# Patient Record
(demographics unavailable — no encounter records)

---

## 2024-11-08 NOTE — HISTORY OF PRESENT ILLNESS
[FreeTextEntry1] : 81 y/o male with BPH and LUTS here today for prostate volume measurement after 6 months. Nocturia x1-2, weak stream currently on Flomax Denies eating spicy, citrus, chocolate. Adequately hydrating  FHx of PCA: denies  Tobacco use: Former smoker  Last PSA: 3.16 ng/mL (03/13/2023) Last Creatinine: 1.16 mg/dL (08/13/2024)  Last UCx: negative (12/15/2023) Uro meds: Tamsulosin 0.4 mg, Dutasteride  11/07/2023 - ARIANNA: There is a 4.5 mm echogenic focus in the lower pole of the right kidney. A 0.8 x 0.8 cm cyst with a thin septation in the mid pole as well as a 1.2 x 0.7 cm simple cyst in the upper pole of the right kidney. There is a 3.2 x 3.6 cm simple cyst in the lower pole of the left kidney without flow. Both kidneys are normal in size and echogenicity without hydronephrosis or solid masses visualized  11/07/23 TRUS: Prostate Volume: 68 cc Length : 67 mm Width : 51 mm Height : 39 mm Impression: enlarged prostate  05/07/24 TRUS: Prostate Volume: 45.5 cc Length : 58 mm Width : 45.5 mm Height : 33.0 mm Impression: decreased in size from prior  Prostate decreasing in volume with med treatment. The patient will continue with his treatment for other 6 months and will come back for visit + TRUS.

## 2024-11-20 NOTE — ASSESSMENT
[FreeTextEntry1] : 1.  Essential hypertension blood pressure  presently i controlled on present medication metoprolol 50 Norvasc 5 and losartan 100.  Blood pressure continues to be well-maintained 2.  History of chronic ischemic heart disease status post PTCI no active angina reasonable exercise tolerance.  No angina  3.  Memory loss stable but a significant problem and ways on the patient's wife  4.  Abnormal echocardiogram with thickened pericardium the pericardial effusion placed on colchicine.  Echo tast visit  again shows thickening of the pericardium with a somewhat smaller pericardial effusion with normal left ventricular function.  The pericardium is thickened with a small effusion on echo in 2022  5.  Abnormal CT of the abdomen suggestive of pancreatic mass.  Patient has not undergone the ERCP.  He does not exercise that much but with walking he has no difficulty.  This has not been followed up on and the patient remains asymptomatic  6.  Status post  possible TIA    7.  Atrial fibrillation with controlled ventricular response.  Patient is asymptomatic.  He remains on Eliquis  8.  Lipid panel ideal  Patient is clinically stable with no active angina and stable blood pressure.  Patient is clinically stable with chronic atrial fibrillation with a controlled ventricular response well-controlled blood pressure.  There are no active cardiac issues

## 2024-11-20 NOTE — HISTORY OF PRESENT ILLNESS
[FreeTextEntry1] : 80 y/o male with BPH and LUTS here today for prostate volume measurement after 6 months. Nocturia x1-2, weak stream currently on Flomax Denies eating spicy, citrus, chocolate. Adequately hydrating  FHx of PCA: denies  Tobacco use: Former smoker  Last PSA: 3.16 ng/mL (03/13/2023) Last Creatinine: 1.16 mg/dL (08/13/2024)  Last UCx: negative (12/15/2023) Uro meds: Tamsulosin 0.4 mg, Dutasteride  11/07/2023 - ARIANNA: There is a 4.5 mm echogenic focus in the lower pole of the right kidney. A 0.8 x 0.8 cm cyst with a thin septation in the mid pole as well as a 1.2 x 0.7 cm simple cyst in the upper pole of the right kidney. There is a 3.2 x 3.6 cm simple cyst in the lower pole of the left kidney without flow. Both kidneys are normal in size and echogenicity without hydronephrosis or solid masses visualized  11/07/23 TRUS: Prostate Volume: 68 cc Length : 67 mm Width : 51 mm Height : 39 mm Impression: enlarged prostate  05/07/24 TRUS: Prostate Volume: 45.5 cc Length : 58 mm Width : 45.5 mm Height : 33.0 mm Impression: decreased in size from prior  05/07/2024 - Prostate decreasing in volume with med treatment. The patient will continue with his treatment for other 6 months and will come back for visit + TRUS.

## 2024-11-20 NOTE — DISCUSSION/SUMMARY
[FreeTextEntry1] : Patient should continue on his present regimen of medication.  There are no active cardiac issues.  His main issue presently revolves around his dementia has not changed significantly.  Routine follow-up again in 6 months

## 2024-11-20 NOTE — HISTORY OF PRESENT ILLNESS
[FreeTextEntry1] : To review, Syed is 80 years old with a history of hypertension hyperlipidemia and a history of coronary artery disease status post remote stent.  He has not had any active cardiac issue for some time.  He does have significant memory loss which has been evaluated.  He has an early dementia with significant short-term memory loss  He denies chest pain chest pressure shortness of breath.  He says he can walk for long distances without difficulty but in general he is fairly sedentary.  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 There were no orthostatic changes at that point  We had added a diuretic to his losartan.  He still not 100% careful with the salt in his diet  As a result of the blood work his Lipitor was increased to 40 a day.  Subsequent to this visit, the patient complained of dizziness and apparently had a fall with fracture of his ribs.  He was hospitalized at [facility] where he stayed for over a week and a half.  He was found to be significantly orthostatic and his hypertensive medications was cut back.  Losartan was stopped Norvasc was stopped and patient does remain on Toprol-XL 50  In addition echocardiography suggested a thickened pericardium with some degree of pericardial effusion but normal LV function.  EKG was unremarkable sed rate was normal  CT of the abdomen found an incidental pancreatic mass and he was seen by gastroenterology for outpatient work-up  He had significant confusion in the hospital with periods of disorientation.  Patient was preop for ERCP to evaluate the pancreatic mass.  He had been on colchicine which is now been discontinued for the thickened pericardium and pericardial effusion though his sed rate was normal.  He was orthostatic on last visit though not as significant as in the hospital  Repeat echocardiogram November 2021 some thickening of the pericardium no significant fluid normal left ventricular function no segmental wall motion abnormality and no significant valvular disease  Overall since last visit he has been feeling well without chest pain chest pressure palpitations.  He has no dizziness he has not fallen he is steady on his feet  Good deal of his medications were discontinued and today the wife does not know exactly what medications he is on.  Apparently it appears that he is on Norvasc 5 losartan 100 Toprol-XL 50  He remains asymptomatic without chest pain shortness of breath dizziness or syncope.  He is in good spirits  He was to undergo ERCP because of hypertension he saw me recently but was stable.  He has never had the ERCP  His memory loss has progressed significantly.  He recently had an episode described by his wife of some numbness in his hands and then some mumbling speech was brought to the emergency room at [place].  He was diagnosed as having a TIA.  Apparently imaging was unremarkable and he was discharged home on Plavix.  His blood pressure in the hospital was quite elevated  Patient has no recollection of this event.  He denies any symptoms of dizziness chest pain numbness or difficulty speaking   visit May 2022 he returns feeling well in good spirits without chest pain chest pressure shortness of breath headache dizziness or syncope.  EKG May 6, 2022 was initially read as normal sinus rhythm however and carefully reviewing the echo it appears that at that point he was in atrial fibrillation with a controlled ventricular response   patient has been feeling well and offers no complaints   visit November 1, 2022:  Patient had a routine visit with Dr. Rodríguez.  He was asymptomatic.  He denies palpitations dizziness or syncope.  His EKG revealed atrial fibrillation with a controlled ventricular response.    It appears that he has had intermittent atrial fibrillation in the past but most time is in sinus rhythm though the EKG in May did show atrial fibrillation in retrospect.    Visit January 17, 2023: Patient feels well denies palpitations shortness of breath chest pain.  According to his wife his memory difficulties have worsened.   present medications include allopurinol, amlodipine 5 mg aspirin 81 atorvastatin 40 donezepil 10 Eliquis 5 mg twice a day potassium citrate Flomax   EKG January 17, 2023 again reveals atrial fibrillation with a ventricular rate of 100 otherwise unremarkable    Visit May 31, 2023:  Blood work May 2023  WBC 8.43 hemoglobin 12.6 hematocrit 40.9  lipid panel triglycerides 109 cholesterol 109 HDL 45 LDL 42 non-HDL 64  sodium 145 potassium 4.0 CO2 22 glucose 155 BUN 19 creatinine 1.10  normal liver function tests except for alkaline phosphatase 122 GFR 68  Patient feels quite well without chest pain chest pressure shortness of breath.  He offers no complaints.  He has good exercise tolerance.  He does have a pleasant dementia with forgetfulness decreased memory but this has been stable   EKG May 31, 2023 A-fib ventricular rate controlled otherwise within normal limits  Visit February 9, 2024: EKG unchanged A-fib ventricular response controlled anteroseptal wall myocardial infarction age-indeterminate Patient overall feels well.  He has a mild dementia.  He is followed by urology and is on a new medication for an enlarged prostate which he did not know the name.  He denies chest pain chest pressure shortness of breath.  He has had no dizziness or syncope  Visit June 21, 2024 EKG is unchanged with decreased R wave progression No acute changes He offers no complaints of chest pain chest pressure shortness of breath lightheadedness or dizziness.  He does not exercise that much but when he walks he has no difficulty he does have a dementia which is stable  Dutasteride has been added to his regimen for benign prostatic hypertrophy  Visit November 20, 2024 Patient offers no complaints and feels well.  He has a pleasant dementia which has not changed.  He denies chest pain chest pressure shortness of breath.  He can walk distances without any difficulty though he is fairly sedentary.  His medications have not changed.

## 2024-11-20 NOTE — ASSESSMENT
[FreeTextEntry1] : 1.  Essential hypertension blood pressure  presently i controlled on present medication metoprolol 50 Norvasc 5 and losartan 100.  Blood pressure continues to be well-maintained 2.  History of chronic ischemic heart disease status post PTCI no active angina reasonable exercise tolerance.  No angina  3.  Memory loss stable but a significant problem and ways on the patient's wife  4.  Abnormal echocardiogram with thickened pericardium the pericardial effusion placed on colchicine.  Echo tast visit  again shows thickening of the pericardium with a somewhat smaller pericardial effusion with normal left ventricular function.  The pericardium is thickened with a small effusion on echo in 2022  5.  Abnormal CT of the abdomen suggestive of pancreatic mass.  Patient has not undergone the ERCP.  He does not exercise that much but with walking he has no difficulty.  This has not been followed up on and the patient remains asymptomatic  6.  Status post  possible TIA    7.  Atrial fibrillation with controlled ventricular response.  Patient is asymptomatic.  He remains on Eliquis  8.  Lipid panel ideal  Patient is clinically stable with no active angina and stable blood pressure.  Patient is clinically stable with chronic atrial fibrillation with a controlled ventricular response well-controlled blood pressure.  There are no active cardiac issues no

## 2024-11-20 NOTE — HISTORY OF PRESENT ILLNESS
[FreeTextEntry1] : 82 y/o male with BPH and LUTS here today for prostate volume measurement after 6 months. Nocturia x1-2, weak stream currently on Flomax Denies eating spicy, citrus, chocolate. Adequately hydrating  FHx of PCA: denies  Tobacco use: Former smoker  Last PSA: 3.16 ng/mL (03/13/2023) Last Creatinine: 1.16 mg/dL (08/13/2024)  Last UCx: negative (12/15/2023) Uro meds: Tamsulosin 0.4 mg, Dutasteride  11/07/2023 - ARIANNA: There is a 4.5 mm echogenic focus in the lower pole of the right kidney. A 0.8 x 0.8 cm cyst with a thin septation in the mid pole as well as a 1.2 x 0.7 cm simple cyst in the upper pole of the right kidney. There is a 3.2 x 3.6 cm simple cyst in the lower pole of the left kidney without flow. Both kidneys are normal in size and echogenicity without hydronephrosis or solid masses visualized  11/07/23 TRUS: Prostate Volume: 68 cc Length : 67 mm Width : 51 mm Height : 39 mm Impression: enlarged prostate  05/07/24 TRUS: Prostate Volume: 45.5 cc Length : 58 mm Width : 45.5 mm Height : 33.0 mm Impression: decreased in size from prior  05/07/2024 - Prostate decreasing in volume with med treatment. The patient will continue with his treatment for other 6 months and will come back for visit + TRUS.

## 2024-11-20 NOTE — PHYSICAL EXAM
[No Carotid Bruit] : no carotid bruit [Normal S1, S2] : normal S1, S2 [No Murmur] : no murmur [Clear Lung Fields] : clear lung fields [No Respiratory Distress] : no respiratory distress  [Soft] : abdomen soft [Non Tender] : non-tender [No Edema] : no edema [Normal Conjunctiva] : the conjunctiva exhibited no abnormalities [Heart Sounds] : normal S1 and S2 [Abdomen Soft] : soft [Abdomen Tenderness] : non-tender [Cyanosis, Localized] : no localized cyanosis [Oriented To Time, Place, And Person] : oriented to person, place, and time [Affect] : the affect was normal [Mood] : the mood was normal [de-identified] : Blood pressure initially 180/70 sitting 170/70 standing no significant orthostatic hypotension today [FreeTextEntry1] : No edema

## 2024-11-20 NOTE — HISTORY OF PRESENT ILLNESS
[FreeTextEntry1] : To review, Syed is 80 years old with a history of hypertension hyperlipidemia and a history of coronary artery disease status post remote stent.  He has not had any active cardiac issue for some time.  He does have significant memory loss which has been evaluated.  He has an early dementia with significant short-term memory loss  He denies chest pain chest pressure shortness of breath.  He says he can walk for long distances without difficulty but in general he is fairly sedentary.  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 There were no orthostatic changes at that point  We had added a diuretic to his losartan.  He still not 100% careful with the salt in his diet  As a result of the blood work his Lipitor was increased to 40 a day.  Subsequent to this visit, the patient complained of dizziness and apparently had a fall with fracture of his ribs.  He was hospitalized at [facility] where he stayed for over a week and a half.  He was found to be significantly orthostatic and his hypertensive medications was cut back.  Losartan was stopped Norvasc was stopped and patient does remain on Toprol-XL 50  In addition echocardiography suggested a thickened pericardium with some degree of pericardial effusion but normal LV function.  EKG was unremarkable sed rate was normal  CT of the abdomen found an incidental pancreatic mass and he was seen by gastroenterology for outpatient work-up  He had significant confusion in the hospital with periods of disorientation.  Patient was preop for ERCP to evaluate the pancreatic mass.  He had been on colchicine which is now been discontinued for the thickened pericardium and pericardial effusion though his sed rate was normal.  He was orthostatic on last visit though not as significant as in the hospital  Repeat echocardiogram November 2021 some thickening of the pericardium no significant fluid normal left ventricular function no segmental wall motion abnormality and no significant valvular disease  Overall since last visit he has been feeling well without chest pain chest pressure palpitations.  He has no dizziness he has not fallen he is steady on his feet  Good deal of his medications were discontinued and today the wife does not know exactly what medications he is on.  Apparently it appears that he is on Norvasc 5 losartan 100 Toprol-XL 50  He remains asymptomatic without chest pain shortness of breath dizziness or syncope.  He is in good spirits  He was to undergo ERCP because of hypertension he saw me recently but was stable.  He has never had the ERCP  His memory loss has progressed significantly.  He recently had an episode described by his wife of some numbness in his hands and then some mumbling speech was brought to the emergency room at [place].  He was diagnosed as having a TIA.  Apparently imaging was unremarkable and he was discharged home on Plavix.  His blood pressure in the hospital was quite elevated  Patient has no recollection of this event.  He denies any symptoms of dizziness chest pain numbness or difficulty speaking   visit May 2022 he returns feeling well in good spirits without chest pain chest pressure shortness of breath headache dizziness or syncope.  EKG May 6, 2022 was initially read as normal sinus rhythm however and carefully reviewing the echo it appears that at that point he was in atrial fibrillation with a controlled ventricular response   patient has been feeling well and offers no complaints   visit November 1, 2022:  Patient had a routine visit with Dr. Rodríguez.  He was asymptomatic.  He denies palpitations dizziness or syncope.  His EKG revealed atrial fibrillation with a controlled ventricular response.    It appears that he has had intermittent atrial fibrillation in the past but most time is in sinus rhythm though the EKG in May did show atrial fibrillation in retrospect.    Visit January 17, 2023: Patient feels well denies palpitations shortness of breath chest pain.  According to his wife his memory difficulties have worsened.   present medications include allopurinol, amlodipine 5 mg aspirin 81 atorvastatin 40 donezepil 10 Eliquis 5 mg twice a day potassium citrate Flomax   EKG January 17, 2023 again reveals atrial fibrillation with a ventricular rate of 100 otherwise unremarkable    Visit May 31, 2023:  Blood work May 2023  WBC 8.43 hemoglobin 12.6 hematocrit 40.9  lipid panel triglycerides 109 cholesterol 109 HDL 45 LDL 42 non-HDL 64  sodium 145 potassium 4.0 CO2 22 glucose 155 BUN 19 creatinine 1.10  normal liver function tests except for alkaline phosphatase 122 GFR 68  Patient feels quite well without chest pain chest pressure shortness of breath.  He offers no complaints.  He has good exercise tolerance.  He does have a pleasant dementia with forgetfulness decreased memory but this has been stable   EKG May 31, 2023 A-fib ventricular rate controlled otherwise within normal limits  Visit February 9, 2024: EKG unchanged A-fib ventricular response controlled anteroseptal wall myocardial infarction age-indeterminate Patient overall feels well.  He has a mild dementia.  He is followed by urology and is on a new medication for an enlarged prostate which he did not know the name.  He denies chest pain chest pressure shortness of breath.  He has had no dizziness or syncope  Visit June 21, 2024 EKG is unchanged with decreased R wave progression No acute changes He offers no complaints of chest pain chest pressure shortness of breath lightheadedness or dizziness.  He does not exercise that much but when he walks he has no difficulty he does have a dementia which is stable  Dutasteride has been added to his regimen for benign prostatic hypertrophy  Visit November 20, 2024 Patient offers no complaints and feels well.  He has a pleasant dementia which has not changed.  He denies chest pain chest pressure shortness of breath.  He can walk distances without any difficulty though he is fairly sedentary.  His medications have not changed.

## 2024-11-20 NOTE — PHYSICAL EXAM
[No Carotid Bruit] : no carotid bruit [Normal S1, S2] : normal S1, S2 [No Murmur] : no murmur [Clear Lung Fields] : clear lung fields [No Respiratory Distress] : no respiratory distress  [Soft] : abdomen soft [Non Tender] : non-tender [No Edema] : no edema [Normal Conjunctiva] : the conjunctiva exhibited no abnormalities [Heart Sounds] : normal S1 and S2 [Abdomen Soft] : soft [Abdomen Tenderness] : non-tender [Cyanosis, Localized] : no localized cyanosis [Oriented To Time, Place, And Person] : oriented to person, place, and time [Affect] : the affect was normal [Mood] : the mood was normal [de-identified] : Blood pressure initially 180/70 sitting 170/70 standing no significant orthostatic hypotension today [FreeTextEntry1] : No edema

## 2025-02-12 NOTE — PHYSICAL EXAM
[Normal Sclera/Conjunctiva] : normal sclera/conjunctiva [No JVD] : no jugular venous distention [No Respiratory Distress] : no respiratory distress  [Clear to Auscultation] : lungs were clear to auscultation bilaterally [Regular Rhythm] : with a regular rhythm [No Murmur] : no murmur heard [No Carotid Bruits] : no carotid bruits [Soft] : abdomen soft [Non Tender] : non-tender [No HSM] : no HSM

## 2025-02-12 NOTE — REVIEW OF SYSTEMS
[Fever] : no fever [Chills] : no chills [Chest Pain] : no chest pain [Palpitations] : no palpitations [de-identified] : Pruritic rash [de-identified] : No memory recall

## 2025-02-12 NOTE — ASSESSMENT
[FreeTextEntry1] : This is a 81 year -old  M who was here today for an annual preventative physical.  A history, relevant physical examination and Health Risk Assessment were performed.     Cognitive Issues: No  Fall Risk:              No Substance Abuse screening was negative.   The following recommendations were made: Exercise regularly. Maintain a healthy diet. Immunizations were reviewed. Yearly Flu shot, was recommended. CRC screening was advised until age 80.  Pancreatic mass: Needs repeat CT to look for stability.   _____________________________________ Severe diffuse maculo-papular rash. Nothing pathognomonic. No new medications.  Wife sleeps in the same bed, so it is not likely to be bedbugs.  Will get a CBC, CMP A1c, Needs to see a Dermatologist. Start Claritin 10 mg a day.

## 2025-02-12 NOTE — HEALTH RISK ASSESSMENT
[[id number]] : 0 [Behavior] : denies difficulty with behavior [Sexually Active] : not sexually active [Reports changes in hearing] : Reports no changes in hearing [Reports changes in vision] : Reports no changes in vision [Reports changes in dental health] : Reports no changes in dental health [TB Exposure] : is not being exposed to tuberculosis [AdvancecareDate] : 02/25

## 2025-02-12 NOTE — REVIEW OF SYSTEMS
[Fever] : no fever [Chills] : no chills [Chest Pain] : no chest pain [Palpitations] : no palpitations [de-identified] : Pruritic rash [de-identified] : No memory recall

## 2025-02-12 NOTE — HISTORY OF PRESENT ILLNESS
[de-identified] : This is annual Preventative examination for this 81 year old White male.  A complete evaluation of their current medication was reviewed with them including OTC medication.   Chronic medical problems for which they are being followed by a physician are:  Demetia ASHD     Exercises regularly: No  Patient has diffuse rash which is very pruritic. Unable to give a time reference but wife says it is more than 3 months. He and his wife sleep in the same bed and she does not have a rash.    .

## 2025-02-12 NOTE — HEALTH RISK ASSESSMENT
[[id number]] : 0 [Behavior] : denies difficulty with behavior [Sexually Active] : not sexually active [Reports changes in hearing] : Reports no changes in hearing [Reports changes in vision] : Reports no changes in vision [Reports changes in dental health] : Reports no changes in dental health [TB Exposure] : is not being exposed to tuberculosis [AdvancecareDate] : 02/25

## 2025-02-12 NOTE — HISTORY OF PRESENT ILLNESS
[de-identified] : This is annual Preventative examination for this 81 year old White male.  A complete evaluation of their current medication was reviewed with them including OTC medication.   Chronic medical problems for which they are being followed by a physician are:  Demetia ASHD     Exercises regularly: No  Patient has diffuse rash which is very pruritic. Unable to give a time reference but wife says it is more than 3 months. He and his wife sleep in the same bed and she does not have a rash.    .

## 2025-03-20 NOTE — HISTORY OF PRESENT ILLNESS
[de-identified] : Patient comes in with hearing issues for the last two days bilaterally. He tried peroxide and that seemed to have helped a little.  No pain in the ears, no ringing in the ears or dizziness. he does wear hearing aids and prior to two days ago was feeling that they were helping.

## 2025-03-20 NOTE — PHYSICAL EXAM
[Midline] : trachea located in midline position [Normal] : no rashes [de-identified] : cerumen in the ear canals; once removed normal EACs

## 2025-03-20 NOTE — END OF VISIT
[FreeTextEntry3] : I, Dr. Hadley personally performed the evaluation and management (E/M) services , including all procedures, for this established patient who presents today with (a) new problem(s)/exacerbation of (an) existing condition(s). That E/M includes conducting the clinically appropriate interval history &/or exam, assessing all new/exacerbated conditions, and establishing a new plan of care. Today, my CARLOS, Mckayla Sullivan, was here to observe &/or participate in the visit & follow plan of care established by me.

## 2025-03-20 NOTE — ASSESSMENT
[FreeTextEntry1] : Patient subtly diminished hearing bilaterally has hearing aids examination cerumen impaction curetted out symptoms resolved follow-up as needed.

## 2025-05-19 NOTE — ASSESSMENT
[FreeTextEntry1] : Hypertension: Well controlled. Continue current medication: Amlodipine 5 mg once a day and losartan 100 mg once a day Low salt diet. I will get a CBC and CMP.  Dementia: The patient was not able to identify the year or the current president.  He did say that Heather was the last president that he remembered.  Atrial fibrillation: Continue Eliquis twice a day.  The problem is stable.  Hyperlipidemia: Continue atorvastatin 40 mg and Zetia 10 mg once a day. Get lipid profile today.  Return 4 months

## 2025-05-19 NOTE — HISTORY OF PRESENT ILLNESS
[de-identified] :   CC: Hypertension follow-up. HPI: This is a 81 year male being seen today for evaluation of:   Hypertension: He is not having any side effects, and he is taking medication properly.  PAF: ON Eliquis for stroke prevention. No evidence of bleeding.

## 2025-05-19 NOTE — PHYSICAL EXAM
[No Acute Distress] : no acute distress [Well Nourished] : well nourished [No Respiratory Distress] : no respiratory distress  [Irregularly Irregular] : irregularly irregular

## 2025-05-21 NOTE — ASSESSMENT
[FreeTextEntry1] : 1.  Essential hypertension blood pressure  Well-controlled on present regimen of medication 2.  History of chronic ischemic heart disease status post PTCI no active angina reasonable exercise tolerance.  No angina  3.  Memory loss stable but a significant problem and weighs on the patient's wife  4.  Abnormal echocardiogram 2022Severely dilated left atrium moderate right atrial enlargement small pericardial effusion with thickened pericardium normal left ventricular systolic function no segmental wall motion abnormality  5.  Abnormal CT of the abdomen suggestive of pancreatic mass.  Patient has not undergone the ERCP.  He does not exercise that much but with walking he has no difficulty.  This has not been followed up on and the patient remains asymptomatic  6.  Status post  possible TIA    7.  Atrial fibrillation with controlled ventricular response.  Patient is asymptomatic.  He remains on EliquisAnd aspirin  8.  Lipid panel ideal  Patient is clinically stable with no active angina and stable blood pressure.  Patient is clinically stable with chronic atrial fibrillation with a controlled ventricular response well-controlled blood pressure.  There are no active cardiac issues.  He continues to have progressive cognitive decline but overall stable and functioning without behavior disturbance

## 2025-05-21 NOTE — HISTORY OF PRESENT ILLNESS
[FreeTextEntry1] : To review, Syed is 81 years old with a history of hypertension hyperlipidemia and a history of coronary artery disease status post remote stent.  He has not had any active cardiac issue for some time.  He does have significant memory loss which has been evaluated.  He has an early dementia with significant short-term memory loss  He denies chest pain chest pressure shortness of breath.  He says he can walk for long distances without difficulty but in general he is fairly sedentary.  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 There were no orthostatic changes at that point  We had added a diuretic to his losartan.  He still not 100% careful with the salt in his diet  As a result of the blood work his Lipitor was increased to 40 a day.  Subsequent to this visit, the patient complained of dizziness and apparently had a fall with fracture of his ribs.  He was hospitalized at [facility] where he stayed for over a week and a half.  He was found to be significantly orthostatic and his hypertensive medications was cut back.  Losartan was stopped Norvasc was stopped and patient does remain on Toprol-XL 50  In addition echocardiography suggested a thickened pericardium with some degree of pericardial effusion but normal LV function.  EKG was unremarkable sed rate was normal  CT of the abdomen found an incidental pancreatic mass and he was seen by gastroenterology for outpatient work-up  He had significant confusion in the hospital with periods of disorientation.  Patient was preop for ERCP to evaluate the pancreatic mass.  He had been on colchicine which is now been discontinued for the thickened pericardium and pericardial effusion though his sed rate was normal.  He was orthostatic on last visit though not as significant as in the hospital  Repeat echocardiogram November 2021 some thickening of the pericardium no significant fluid normal left ventricular function no segmental wall motion abnormality and no significant valvular disease  Overall since last visit he has been feeling well without chest pain chest pressure palpitations.  He has no dizziness he has not fallen he is steady on his feet  Good deal of his medications were discontinued and today the wife does not know exactly what medications he is on.  Apparently it appears that he is on Norvasc 5 losartan 100 Toprol-XL 50  He remains asymptomatic without chest pain shortness of breath dizziness or syncope.  He is in good spirits  He was to undergo ERCP because of hypertension he saw me recently but was stable.  He has never had the ERCP  His memory loss has progressed significantly.  He recently had an episode described by his wife of some numbness in his hands and then some mumbling speech was brought to the emergency room at [facility].  He was diagnosed as having a TIA.  Apparently imaging was unremarkable and he was discharged home on Plavix.  His blood pressure in the hospital was quite elevated  Patient has no recollection of this event.  He denies any symptoms of dizziness chest pain numbness or difficulty speaking   visit May 2022 he returns feeling well in good spirits without chest pain chest pressure shortness of breath headache dizziness or syncope.  EKG May 6, 2022 was initially read as normal sinus rhythm however and carefully reviewing the echo it appears that at that point he was in atrial fibrillation with a controlled ventricular response   patient has been feeling well and offers no complaints   visit November 1, 2022:  Patient had a routine visit with Dr. Rodríguez.  He was asymptomatic.  He denies palpitations dizziness or syncope.  His EKG revealed atrial fibrillation with a controlled ventricular response.    It appears that he has had intermittent atrial fibrillation in the past but most time is in sinus rhythm though the EKG in May did show atrial fibrillation in retrospect.    Visit January 17, 2023: Patient feels well denies palpitations shortness of breath chest pain.  According to his wife his memory difficulties have worsened.   present medications include allopurinol, amlodipine 5 mg aspirin 81 atorvastatin 40 donezepil 10 Eliquis 5 mg twice a day potassium citrate Flomax   EKG January 17, 2023 again reveals atrial fibrillation with a ventricular rate of 100 otherwise unremarkable    Visit May 31, 2023:  Blood work May 2023  WBC 8.43 hemoglobin 12.6 hematocrit 40.9  lipid panel triglycerides 109 cholesterol 109 HDL 45 LDL 42 non-HDL 64  sodium 145 potassium 4.0 CO2 22 glucose 155 BUN 19 creatinine 1.10  normal liver function tests except for alkaline phosphatase 122 GFR 68  Patient feels quite well without chest pain chest pressure shortness of breath.  He offers no complaints.  He has good exercise tolerance.  He does have a pleasant dementia with forgetfulness decreased memory but this has been stable     EKG May 31, 2023 A-fib ventricular rate controlled otherwise within normal limits  Visit February 9, 2024: EKG unchanged A-fib ventricular response controlled anteroseptal wall myocardial infarction age-indeterminate Patient overall feels well.  He has a mild dementia.  He is followed by urology and is on a new medication for an enlarged prostate which he did not know the name.  He denies chest pain chest pressure shortness of breath.  He has had no dizziness or syncope  Visit June 21, 2024 EKG is unchanged with decreased R wave progression No acute changes He offers no complaints of chest pain chest pressure shortness of breath lightheadedness or dizziness.  He does not exercise that much but when he walks he has no difficulty he does have a dementia which is stable  Dutasteride has been added to his regimen for benign prostatic hypertrophy  Visit November 20, 2024 Patient offers no complaints and feels well.  He has a pleasant dementia which has not changed.  He denies chest pain chest pressure shortness of breath.  He can walk distances without any difficulty though he is fairly sedentary.  His medications have not changed.  Visit May 21, 2025 Patient feels quite well offers no complaints of chest pain chest pressure shortness of breath.  He denies dizziness.  He has no edema orthopnea or PND EKG May 21, 2025 atrial fibrillation ventricular rate 85 though occasionally he has little spurts of ventricular rate being faster  decreased R wave progression no change from prior EKG Blood work May 2025 Hemoglobin 12.3 hematocrit 38 LDL 49 GFR 60

## 2025-05-21 NOTE — PHYSICAL EXAM
[No Carotid Bruit] : no carotid bruit [Normal S1, S2] : normal S1, S2 [No Murmur] : no murmur [Clear Lung Fields] : clear lung fields [No Respiratory Distress] : no respiratory distress  [Soft] : abdomen soft [Non Tender] : non-tender [No Edema] : no edema [Normal Conjunctiva] : the conjunctiva exhibited no abnormalities [Heart Sounds] : normal S1 and S2 [Abdomen Soft] : soft [Abdomen Tenderness] : non-tender [Cyanosis, Localized] : no localized cyanosis [Oriented To Time, Place, And Person] : oriented to person, place, and time [Affect] : the affect was normal [Mood] : the mood was normal [de-identified] : Awake alert no acute distress [FreeTextEntry1] : No edema

## 2025-05-21 NOTE — DISCUSSION/SUMMARY
[FreeTextEntry1] : The patient continues present regimen of medication no further cardiac workup is needed presently at some point we may want to repeat the echocardiogram as it has been sometime since 2022 but his cardiac status has remained stable and blood pressure well-controlled and his A-fib rate relatively well-controlled  Follow-up 4 to 6 months [EKG obtained to assist in diagnosis and management of assessed problem(s)] : EKG obtained to assist in diagnosis and management of assessed problem(s)

## 2025-05-21 NOTE — REASON FOR VISIT
[FreeTextEntry1] : Bernaard Goldberg 81 years old here for routine follow-up of his cardiac status.Seen on May 21, 2025

## 2025-05-22 NOTE — ASSESSMENT
[FreeTextEntry1] : Patient has hearing aids here for diminished hearing cerumen impaction curetted out is gone for new set of hearing aids are not sure he really needs them we will follow-up and see us as needed.

## 2025-05-22 NOTE — PHYSICAL EXAM
[Midline] : trachea located in midline position [Normal] : no rashes [de-identified] : cerumen in the ear canals; once removed normal EACs

## 2025-05-22 NOTE — END OF VISIT
[FreeTextEntry3] : I, Dr. Hadley personally performed the evaluation and management (E/M) services , including all procedures, for this established patient who presents today with (a) new problem(s)/exacerbation of (an) existing condition(s). That E/M includes conducting the clinically appropriate interval history &/or exam, assessing all new/exacerbated conditions, and establishing a new plan of care. Today, my CARLOS, Mckayla Sullivan, was here to observe &/or participate in the visit & follow plan of care established by me.   1